# Patient Record
Sex: MALE | Race: WHITE | NOT HISPANIC OR LATINO | Employment: FULL TIME | RURAL
[De-identification: names, ages, dates, MRNs, and addresses within clinical notes are randomized per-mention and may not be internally consistent; named-entity substitution may affect disease eponyms.]

---

## 2021-01-04 ENCOUNTER — OFFICE VISIT (OUTPATIENT)
Dept: FAMILY MEDICINE CLINIC | Facility: CLINIC | Age: 36
End: 2021-01-04

## 2021-01-04 VITALS
HEIGHT: 72 IN | SYSTOLIC BLOOD PRESSURE: 124 MMHG | DIASTOLIC BLOOD PRESSURE: 81 MMHG | OXYGEN SATURATION: 97 % | TEMPERATURE: 97.3 F | HEART RATE: 75 BPM | BODY MASS INDEX: 37.76 KG/M2 | RESPIRATION RATE: 16 BRPM | WEIGHT: 278.8 LBS

## 2021-01-04 DIAGNOSIS — R53.83 MALAISE AND FATIGUE: ICD-10-CM

## 2021-01-04 DIAGNOSIS — E66.01 CLASS 2 SEVERE OBESITY DUE TO EXCESS CALORIES WITH SERIOUS COMORBIDITY AND BODY MASS INDEX (BMI) OF 37.0 TO 37.9 IN ADULT (HCC): ICD-10-CM

## 2021-01-04 DIAGNOSIS — M25.50 ARTHRALGIA, UNSPECIFIED JOINT: ICD-10-CM

## 2021-01-04 DIAGNOSIS — Z87.891 HISTORY OF TOBACCO USE: ICD-10-CM

## 2021-01-04 DIAGNOSIS — R53.81 MALAISE AND FATIGUE: ICD-10-CM

## 2021-01-04 DIAGNOSIS — E55.9 VITAMIN D DEFICIENCY: ICD-10-CM

## 2021-01-04 DIAGNOSIS — Z13.220 SCREENING FOR HYPERLIPIDEMIA: ICD-10-CM

## 2021-01-04 DIAGNOSIS — Z00.00 ANNUAL PHYSICAL EXAM: Primary | ICD-10-CM

## 2021-01-04 PROBLEM — D22.5 COMPOUND NEVUS OF BACK: Status: ACTIVE | Noted: 2021-01-04

## 2021-01-04 LAB
BILIRUB BLD-MCNC: NEGATIVE MG/DL
CLARITY, POC: CLEAR
COLOR UR: YELLOW
GLUCOSE UR STRIP-MCNC: NEGATIVE MG/DL
KETONES UR QL: NEGATIVE
LEUKOCYTE EST, POC: NEGATIVE
NITRITE UR-MCNC: NEGATIVE MG/ML
PH UR: 5 [PH] (ref 5–8)
PROT UR STRIP-MCNC: NEGATIVE MG/DL
RBC # UR STRIP: NEGATIVE /UL
SP GR UR: 1.02 (ref ?–1.03)
UROBILINOGEN UR QL: NORMAL

## 2021-01-04 PROCEDURE — 81002 URINALYSIS NONAUTO W/O SCOPE: CPT | Performed by: FAMILY MEDICINE

## 2021-01-04 PROCEDURE — 99385 PREV VISIT NEW AGE 18-39: CPT | Performed by: FAMILY MEDICINE

## 2021-01-04 RX ORDER — LORATADINE 5 MG/1
1 TABLET, CHEWABLE ORAL DAILY
COMMUNITY

## 2021-01-04 NOTE — PROGRESS NOTES
Subjective   Yovani Franks is a 35 y.o. male.     Chief Complaint   Patient presents with   • Annual Exam   • Abdominal Pain     after consumption of dairy        The patient is here: to discuss health maintenance and disease prevention.  Last comprehensive physical was on Dec 2015.  Patient's previous physician was Dr Mae .  Overall has: moderate activity with work/home activities, good appetite, feels well with minor complaints and good energy level. Nutrition: eating a variety of foods. Last tetanus shot was unknown. Patient's last colonoscopy was: unknown.    Toe Pain   The incident occurred more than 1 week ago. The pain is present in the right foot. The quality of the pain is described as aching and stabbing. The pain is at a severity of 6/10. The pain is moderate. The pain has been constant since onset. Pertinent negatives include no inability to bear weight or loss of sensation. He has tried ice for the symptoms. The treatment provided significant relief.   Abdominal Pain  This is a new problem. The current episode started more than 1 year ago. The onset quality is gradual. The problem occurs intermittently. The most recent episode lasted 1 day. The problem has been unchanged. The quality of the pain is cramping. Associated symptoms include diarrhea. Pertinent negatives include no constipation, fever, frequency, hematuria, nausea or vomiting.        Recent Hospitalizations:  No hospitalization(s) within the last year..  ccc      I personally reviewed and updated the patient's allergies, medications, problem list, and past medical, surgical, social, and family history. I have reviewed and confirmed the accuracy of the HPI and ROS as documented by the MA/LPN/RN Mainor Pacheco MD    Family History   Problem Relation Age of Onset   • Diabetes Father        Social History     Tobacco Use   • Smoking status: Former Smoker     Packs/day: 1.00     Years: 3.00     Pack years: 3.00     Types: Cigarettes      "Start date:      Quit date:      Years since quittin.0   • Smokeless tobacco: Never Used   Substance Use Topics   • Alcohol use: Yes     Frequency: 2-4 times a month     Drinks per session: 1 or 2     Binge frequency: Less than monthly   • Drug use: Not Currently       Past Surgical History:   Procedure Laterality Date   • ADENOIDECTOMY     • KNEE ARTHROTOMY Right    • TONSILLECTOMY         Patient Active Problem List   Diagnosis   • Compound nevus of back   • Annual physical exam   • Class 2 severe obesity due to excess calories with serious comorbidity and body mass index (BMI) of 37.0 to 37.9 in adult (CMS/[facility])   • History of tobacco use         Current Outpatient Medications:   •  loratadine (Claritin) 5 MG chewable tablet, Chew 1 tablet Daily., Disp: , Rfl:          Review of Systems   Constitutional: Negative for chills, diaphoresis and fever.   HENT: Negative for trouble swallowing and voice change.    Eyes: Negative for visual disturbance.   Respiratory: Negative for shortness of breath.    Cardiovascular: Negative for chest pain and palpitations.   Gastrointestinal: Positive for abdominal pain and diarrhea. Negative for constipation, nausea and vomiting.   Endocrine: Negative for polydipsia and polyphagia.   Genitourinary: Negative for frequency and hematuria.   Musculoskeletal: Negative for neck stiffness.   Skin: Negative for color change and pallor.   Allergic/Immunologic: Negative for immunocompromised state.   Neurological: Negative for seizures and syncope.   Hematological: Negative for adenopathy.   Psychiatric/Behavioral: Negative for sleep disturbance and suicidal ideas.       I have reviewed and confirmed the accuracy of the ROS as documented by the MA/LPN/RN Mainor Pacheco MD      Objective   /81 (BP Location: Right arm, Patient Position: Sitting)   Pulse 75   Temp 97.3 °F (36.3 °C)   Resp 16   Ht 182.9 cm (72\")   Wt 126 kg (278 lb 12.8 oz)   SpO2 97%   BMI " 37.81 kg/m²   BP Readings from Last 3 Encounters:   01/04/21 124/81     Wt Readings from Last 3 Encounters:   01/04/21 126 kg (278 lb 12.8 oz)     Physical Exam  Constitutional:       Appearance: He is well-developed. He is not diaphoretic.   HENT:      Head: Normocephalic.      Right Ear: Tympanic membrane, ear canal and external ear normal.      Left Ear: Tympanic membrane, ear canal and external ear normal.      Nose: Nose normal.   Eyes:      General: Lids are normal.      Conjunctiva/sclera: Conjunctivae normal.      Pupils: Pupils are equal, round, and reactive to light.   Neck:      Thyroid: No thyromegaly.      Vascular: No carotid bruit or JVD.      Trachea: No tracheal deviation.   Cardiovascular:      Rate and Rhythm: Normal rate and regular rhythm.      Heart sounds: Normal heart sounds. No murmur. No friction rub. No gallop.    Pulmonary:      Effort: Pulmonary effort is normal.      Breath sounds: Normal breath sounds. No stridor. No decreased breath sounds, wheezing or rales.   Abdominal:      General: Bowel sounds are normal. There is no distension.      Palpations: Abdomen is soft. There is no mass.      Tenderness: There is no abdominal tenderness. There is no guarding or rebound.      Hernia: No hernia is present.   Lymphadenopathy:      Head:      Right side of head: No submental, submandibular, tonsillar, preauricular, posterior auricular or occipital adenopathy.      Left side of head: No submental, submandibular, tonsillar, preauricular, posterior auricular or occipital adenopathy.      Cervical: No cervical adenopathy.   Skin:     General: Skin is warm and dry.      Coloration: Skin is not pale.   Neurological:      Mental Status: He is alert and oriented to person, place, and time.      Cranial Nerves: No cranial nerve deficit.      Sensory: No sensory deficit.      Coordination: Coordination normal.      Gait: Gait normal.      Deep Tendon Reflexes: Reflexes are normal and symmetric.          Data / Lab Results:    No results found for: HGBA1C  Lab Results   Component Value Date     (H) 01/04/2021     Lab Results   Component Value Date     (H) 01/04/2021     No results found for: CHOL  Lab Results   Component Value Date    TRIG 63 01/04/2021     Lab Results   Component Value Date    HDL 47 01/04/2021     No results found for: PSA  Lab Results   Component Value Date    WBC 7.1 01/04/2021    HGB 15.9 01/04/2021    HCT 46.1 01/04/2021    MCV 89 01/04/2021     01/04/2021     Lab Results   Component Value Date    TSH 1.180 01/04/2021      Lab Results   Component Value Date    BUN 10 01/04/2021    CREATININE 0.86 01/04/2021    EGFRIFNONA 114 01/04/2021    EGFRIFAFRI 132 01/04/2021    BCR 12 01/04/2021    K 4.7 01/04/2021    CO2 25 01/04/2021    CALCIUM 9.7 01/04/2021    PROTENTOTREF 7.5 01/04/2021    ALBUMIN 4.6 01/04/2021    LABIL2 1.6 01/04/2021    AST 16 01/04/2021    ALT 28 01/04/2021     No results found for: MARIIA, RF, SEDRATE   No results found for: CRP   No results found for: IRON, TIBC, FERRITIN   No results found for: [id number]     Age-appropriate Screening Schedule:  Refer to the list below for future screening recommendations based on patient's age, sex and/or medical conditions. Orders for these recommended tests are listed in the plan section. The patient has been provided with a written plan.    Health Maintenance   Topic Date Due   • TDAP/TD VACCINES (2 - Tdap) 05/29/1998   • INFLUENZA VACCINE  08/01/2020           Assessment/Plan      Medications        Problem List         LOS    Physical.  Doing well, vaccines updated.  He agrees to update tetanus shot at [employer].  Discussed health maintenance, screening test, lifestyle modification.  Screening for hyperlipidemia.  Check fasting labs.  Toe pain.  Possibly secondary to gout, positive family history.  Discussed diet, lifestyle [place].  Check uric acid level.  Dysthymia.  Check vitamin D level\start supplement.   Overall coping well.        Diagnoses and all orders for this visit:    1. Annual physical exam (Primary)  -     POCT urinalysis dipstick, manual    2. Malaise and fatigue  -     CBC & Differential  -     Comprehensive Metabolic Panel  -     TSH    3. Arthralgia, unspecified joint  -     Uric Acid    4. Screening for hyperlipidemia  -     Lipid Panel With / Chol / HDL Ratio    5. Vitamin D deficiency  -     Vitamin D 25 Hydroxy    6. Class 2 severe obesity due to excess calories with serious comorbidity and body mass index (BMI) of 37.0 to 37.9 in adult (CMS/[facility])    7. History of tobacco use              Expected course, medications, and adverse effects discussed.  Call or return if worsening or persistent symptoms.  I wore protective equipment throughout this patient encounter including a mask, gloves, and eye protection.  Hand hygiene was performed before donning protective equipment and after removal when leaving the room. The complete contents of the Assessment and Plan and Data / Lab Results as documented above have been reviewed and addressed by myself with the patient today as part of an ongoing evaluation / treatment plan.  If some of the documentation has been copied from a previous note and is unchanged it indicates that this problem / plan has been assessed today but is stable from a previous visit and no changes have been recommended.

## 2021-01-05 LAB
25(OH)D3+25(OH)D2 SERPL-MCNC: 21.4 NG/ML (ref 30–100)
ALBUMIN SERPL-MCNC: 4.6 G/DL (ref 4–5)
ALBUMIN/GLOB SERPL: 1.6 {RATIO} (ref 1.2–2.2)
ALP SERPL-CCNC: 61 IU/L (ref 39–117)
ALT SERPL-CCNC: 28 IU/L (ref 0–44)
AST SERPL-CCNC: 16 IU/L (ref 0–40)
BASOPHILS # BLD AUTO: 0 X10E3/UL (ref 0–0.2)
BASOPHILS NFR BLD AUTO: 1 %
BILIRUB SERPL-MCNC: 0.6 MG/DL (ref 0–1.2)
BUN SERPL-MCNC: 10 MG/DL (ref 6–20)
BUN/CREAT SERPL: 12 (ref 9–20)
CALCIUM SERPL-MCNC: 9.7 MG/DL (ref 8.7–10.2)
CHLORIDE SERPL-SCNC: 99 MMOL/L (ref 96–106)
CHOLEST SERPL-MCNC: 160 MG/DL (ref 100–199)
CHOLEST/HDLC SERPL: 3.4 RATIO (ref 0–5)
CO2 SERPL-SCNC: 25 MMOL/L (ref 20–29)
CREAT SERPL-MCNC: 0.86 MG/DL (ref 0.76–1.27)
EOSINOPHIL # BLD AUTO: 0.2 X10E3/UL (ref 0–0.4)
EOSINOPHIL NFR BLD AUTO: 2 %
ERYTHROCYTE [DISTWIDTH] IN BLOOD BY AUTOMATED COUNT: 12.3 % (ref 11.6–15.4)
GLOBULIN SER CALC-MCNC: 2.9 G/DL (ref 1.5–4.5)
GLUCOSE SERPL-MCNC: 109 MG/DL (ref 65–99)
HCT VFR BLD AUTO: 46.1 % (ref 37.5–51)
HDLC SERPL-MCNC: 47 MG/DL
HGB BLD-MCNC: 15.9 G/DL (ref 13–17.7)
IMM GRANULOCYTES # BLD AUTO: 0 X10E3/UL (ref 0–0.1)
IMM GRANULOCYTES NFR BLD AUTO: 0 %
LDLC SERPL CALC-MCNC: 100 MG/DL (ref 0–99)
LYMPHOCYTES # BLD AUTO: 2.2 X10E3/UL (ref 0.7–3.1)
LYMPHOCYTES NFR BLD AUTO: 31 %
MCH RBC QN AUTO: 30.7 PG (ref 26.6–33)
MCHC RBC AUTO-ENTMCNC: 34.5 G/DL (ref 31.5–35.7)
MCV RBC AUTO: 89 FL (ref 79–97)
MONOCYTES # BLD AUTO: 0.6 X10E3/UL (ref 0.1–0.9)
MONOCYTES NFR BLD AUTO: 8 %
NEUTROPHILS # BLD AUTO: 4.1 X10E3/UL (ref 1.4–7)
NEUTROPHILS NFR BLD AUTO: 58 %
PLATELET # BLD AUTO: 287 X10E3/UL (ref 150–450)
POTASSIUM SERPL-SCNC: 4.7 MMOL/L (ref 3.5–5.2)
PROT SERPL-MCNC: 7.5 G/DL (ref 6–8.5)
RBC # BLD AUTO: 5.18 X10E6/UL (ref 4.14–5.8)
SODIUM SERPL-SCNC: 138 MMOL/L (ref 134–144)
TRIGL SERPL-MCNC: 63 MG/DL (ref 0–149)
TSH SERPL DL<=0.005 MIU/L-ACNC: 1.18 UIU/ML (ref 0.45–4.5)
URATE SERPL-MCNC: 7.7 MG/DL (ref 3.8–8.4)
VLDLC SERPL CALC-MCNC: 13 MG/DL (ref 5–40)
WBC # BLD AUTO: 7.1 X10E3/UL (ref 3.4–10.8)

## 2021-01-11 ENCOUNTER — TELEPHONE (OUTPATIENT)
Dept: FAMILY MEDICINE CLINIC | Facility: CLINIC | Age: 36
End: 2021-01-11

## 2021-01-11 DIAGNOSIS — E55.9 VITAMIN D DEFICIENCY: ICD-10-CM

## 2021-01-11 DIAGNOSIS — E55.9 VITAMIN D DEFICIENCY: Primary | ICD-10-CM

## 2021-01-11 RX ORDER — ERGOCALCIFEROL 1.25 MG/1
50000 CAPSULE ORAL WEEKLY
Qty: 6 CAPSULE | Refills: 0 | Status: SHIPPED | OUTPATIENT
Start: 2021-01-11 | End: 2021-01-12 | Stop reason: SDUPTHER

## 2021-01-11 NOTE — TELEPHONE ENCOUNTER
----- Message from Mainor Pacheco MD sent at 1/9/2021  8:40 AM EST -----  Let him know his blood work overall looks good, blood count, kidney function are normal, his uric acid level for gout is also normal, he does have a mild elevation of his blood sugar, want him to work on diet and exercise, his vitamin D level is very low, this may be causing him to have some fatigue in the winter months, go ahead and send in high-dose vitamin D 3, 50,000 units weekly for 6 weeks, after that he should take 4000 units of over-the-counter vitamin D daily, but schedule him back in a year for a physical, fasting for panel plus a vitamin D level ahead, thanks

## 2021-01-12 DIAGNOSIS — E55.9 VITAMIN D DEFICIENCY: ICD-10-CM

## 2021-01-12 RX ORDER — ERGOCALCIFEROL 1.25 MG/1
50000 CAPSULE ORAL WEEKLY
Qty: 6 CAPSULE | Refills: 0 | Status: SHIPPED | OUTPATIENT
Start: 2021-01-12 | End: 2021-01-12 | Stop reason: SDUPTHER

## 2021-01-12 RX ORDER — ERGOCALCIFEROL 1.25 MG/1
50000 CAPSULE ORAL WEEKLY
Qty: 6 CAPSULE | Refills: 0 | Status: SHIPPED | OUTPATIENT
Start: 2021-01-12

## 2021-01-22 ENCOUNTER — OFFICE VISIT (OUTPATIENT)
Dept: FAMILY MEDICINE CLINIC | Facility: CLINIC | Age: 36
End: 2021-01-22

## 2021-01-22 VITALS
WEIGHT: 268.8 LBS | RESPIRATION RATE: 18 BRPM | HEIGHT: 72 IN | TEMPERATURE: 98.4 F | DIASTOLIC BLOOD PRESSURE: 78 MMHG | SYSTOLIC BLOOD PRESSURE: 120 MMHG | HEART RATE: 88 BPM | OXYGEN SATURATION: 97 % | BODY MASS INDEX: 36.41 KG/M2

## 2021-01-22 DIAGNOSIS — M79.641 BILATERAL HAND PAIN: Primary | ICD-10-CM

## 2021-01-22 DIAGNOSIS — M79.642 BILATERAL HAND PAIN: Primary | ICD-10-CM

## 2021-01-22 DIAGNOSIS — R60.9 EDEMA, UNSPECIFIED TYPE: ICD-10-CM

## 2021-01-22 DIAGNOSIS — R21 RASH OF BOTH HANDS: ICD-10-CM

## 2021-01-22 PROCEDURE — 99213 OFFICE O/P EST LOW 20 MIN: CPT | Performed by: FAMILY MEDICINE

## 2021-01-22 RX ORDER — BETAMETHASONE DIPROPIONATE 0.5 MG/G
CREAM TOPICAL 2 TIMES DAILY
Qty: 60 G | Refills: 1 | Status: SHIPPED | OUTPATIENT
Start: 2021-01-22

## 2021-01-22 RX ORDER — PREDNISONE 1 MG/1
TABLET ORAL
Qty: 45 TABLET | Refills: 0 | Status: SHIPPED | OUTPATIENT
Start: 2021-01-22

## 2021-01-22 RX ORDER — BETAMETHASONE DIPROPIONATE 0.05 %
OINTMENT (GRAM) TOPICAL NIGHTLY
Qty: 60 G | Refills: 1 | Status: SHIPPED | OUTPATIENT
Start: 2021-01-22

## 2021-01-22 NOTE — PROGRESS NOTES
Subjective   Yovani Franks is a 35 y.o. male.     Chief Complaint   Patient presents with   • Hand Pain     Bilateral    • Leg Swelling       Hand Pain   The incident occurred 3 to 5 days ago. The incident occurred at home. There was no injury mechanism. The pain is present in the left fingers, right hand, left hand, right wrist, left wrist and right fingers. The quality of the pain is described as aching and burning (tingling). The pain does not radiate. The pain is at a severity of 8/10. The pain is moderate. The pain has been fluctuating since the incident. Associated symptoms include muscle weakness and tingling. Pertinent negatives include no chest pain or numbness. Associated symptoms comments: throbbing. The symptoms are aggravated by palpation. He has tried NSAIDs and ice for the symptoms. The treatment provided mild relief.   Leg Swelling  This is a new problem. The current episode started 1 to 4 weeks ago. The problem occurs 2 to 4 times per day. The problem has been resolved. Pertinent negatives include no abdominal pain, chest pain, chills, diaphoresis, nausea or numbness. The symptoms are aggravated by walking and standing. He has tried NSAIDs, rest and relaxation for the symptoms. The treatment provided moderate relief.            I personally reviewed and updated the patient's allergies, medications, problem list, and past medical, surgical, social, and family history. I have reviewed and confirmed the accuracy of the History of Present Illness and Review of Symptoms as documented by the MA/GARYN/RN. Mainor Pacheco MD    Family History   Problem Relation Age of Onset   • Diabetes Father        Social History     Tobacco Use   • Smoking status: Former Smoker     Packs/day: 1.00     Years: 3.00     Pack years: 3.00     Types: Cigarettes     Start date:      Quit date:      Years since quittin.0   • Smokeless tobacco: Never Used   Substance Use Topics   • Alcohol use: Yes     Frequency:  2-4 times a month     Drinks per session: 1 or 2     Binge frequency: Less than monthly   • Drug use: Not Currently       Past Surgical History:   Procedure Laterality Date   • ADENOIDECTOMY  2005   • KNEE ARTHROTOMY Right 2005   • TONSILLECTOMY  2005       Patient Active Problem List   Diagnosis   • Compound nevus of back   • Annual physical exam   • Class 2 severe obesity due to excess calories with serious comorbidity and body mass index (BMI) of 37.0 to 37.9 in adult (CMS/HCC)   • History of tobacco use         Current Outpatient Medications:   •  loratadine (Claritin) 5 MG chewable tablet, Chew 1 tablet Daily., Disp: , Rfl:   •  vitamin D (ERGOCALCIFEROL) 1.25 MG (97884 UT) capsule capsule, Take 1 capsule by mouth 1 (One) Time Per Week. For 6 weeks, Disp: 6 capsule, Rfl: 0  •  betamethasone dipropionate (DIPROLENE) 0.05 % ointment, Apply  topically to the appropriate area as directed Every Night. apply to hands one time nightly, Disp: 60 g, Rfl: 1  •  betamethasone dipropionate 0.05 % cream, Apply  topically to the appropriate area as directed 2 (Two) Times a Day. Apply to hands two times a day, Disp: 60 g, Rfl: 1  •  predniSONE (DELTASONE) 5 MG tablet, 40mg x 3 days, 20mg x 3 days, 10mg x 3 days, 5mg x 3 days, Disp: 45 tablet, Rfl: 0         Review of Systems   Constitutional: Negative for chills and diaphoresis.   Eyes: Negative for visual disturbance.   Respiratory: Negative for shortness of breath.    Cardiovascular: Negative for chest pain and palpitations.   Gastrointestinal: Negative for abdominal pain and nausea.   Endocrine: Negative for polydipsia and polyphagia.   Musculoskeletal: Negative for neck stiffness.   Skin: Negative for color change and pallor.   Neurological: Positive for tingling. Negative for seizures, syncope and numbness.   Hematological: Negative for adenopathy.       I have reviewed and confirmed the accuracy of the ROS as documented by the MA/LPN/RN Mainor Pacheco MD      Objective  "  /78 (BP Location: Left arm, Patient Position: Sitting)   Pulse 88   Temp 98.4 °F (36.9 °C)   Resp 18   Ht 182.9 cm (72\")   Wt 122 kg (268 lb 12.8 oz)   SpO2 97%   BMI 36.46 kg/m²   BP Readings from Last 3 Encounters:   01/22/21 120/78   01/04/21 124/81     Wt Readings from Last 3 Encounters:   01/22/21 122 kg (268 lb 12.8 oz)   01/04/21 126 kg (278 lb 12.8 oz)     Physical Exam  Constitutional:       Appearance: Normal appearance. He is well-developed. He is not diaphoretic.   Cardiovascular:      Rate and Rhythm: Normal rate and regular rhythm.      Pulses: Normal pulses.      Heart sounds: Normal heart sounds, S1 normal and S2 normal. No murmur. No friction rub. No gallop.    Pulmonary:      Effort: Pulmonary effort is normal. No accessory muscle usage.      Breath sounds: Normal breath sounds. No stridor. No decreased breath sounds, wheezing, rhonchi or rales.   Abdominal:      General: Bowel sounds are normal. There is no distension.      Palpations: Abdomen is soft. Abdomen is not rigid. There is no mass or pulsatile mass.      Tenderness: There is no abdominal tenderness. There is no guarding or rebound. Negative signs include Harris's sign.      Hernia: No hernia is present.   Skin:     General: Skin is warm and dry.      Coloration: Skin is not pale.   Neurological:      Mental Status: He is alert and oriented to person, place, and time.      Coordination: Coordination normal.      Gait: Gait normal.         Data / Lab Results:    No results found for: HGBA1C  Lab Results   Component Value Date     (H) 01/04/2021     Lab Results   Component Value Date     (H) 01/04/2021     No results found for: CHOL  Lab Results   Component Value Date    TRIG 63 01/04/2021     Lab Results   Component Value Date    HDL 47 01/04/2021     No results found for: PSA  Lab Results   Component Value Date    WBC 7.1 01/04/2021    HGB 15.9 01/04/2021    HCT 46.1 01/04/2021    MCV 89 01/04/2021     " 01/04/2021     Lab Results   Component Value Date    TSH 1.180 01/04/2021      Lab Results   Component Value Date    BUN 10 01/04/2021    CREATININE 0.86 01/04/2021    EGFRIFNONA 114 01/04/2021    EGFRIFAFRI 132 01/04/2021    BCR 12 01/04/2021    K 4.7 01/04/2021    CO2 25 01/04/2021    CALCIUM 9.7 01/04/2021    PROTENTOTREF 7.5 01/04/2021    ALBUMIN 4.6 01/04/2021    LABIL2 1.6 01/04/2021    AST 16 01/04/2021    ALT 28 01/04/2021     No results found for: MARIIA, RF, SEDRATE   No results found for: CRP   No results found for: IRON, TIBC, FERRITIN   No results found for: [id number]       Assessment/Plan      Medications        Problem List         LOS      Health maintenance.  Doing well, vaccines updated.  He agrees to update tetanus shot at [employer].  Discussed health maintenance, screening test, lifestyle modification.  Screening for hyperlipidemia.  Check fasting labs.  Toe pain.  Improved today.  Positive family history gout, uric acid benign 1/20.    Dysthymia.    Improved currently with vitamin D supplementation.  Overall coping well  Dyshidrotic eczema.  Flare currently, start prednisone.  Start betamethasone.  Topical care discussed.  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  DDx includes allergies/consider allergist referral if persistent symptoms.  Vitamin D deficiency.  Improving currently on replacement.  Follow-up recheck levels.      Diagnoses and all orders for this visit:    1. Bilateral hand pain (Primary)  -     predniSONE (DELTASONE) 5 MG tablet; 40mg x 3 days, 20mg x 3 days, 10mg x 3 days, 5mg x 3 days  Dispense: 45 tablet; Refill: 0  -     betamethasone dipropionate 0.05 % cream; Apply  topically to the appropriate area as directed 2 (Two) Times a Day. Apply to hands two times a day  Dispense: 60 g; Refill: 1  -     betamethasone dipropionate (DIPROLENE) 0.05 % ointment; Apply  topically to the appropriate area as directed Every Night. apply to hands one time  nightly  Dispense: 60 g; Refill: 1    2. Edema, unspecified type  -     predniSONE (DELTASONE) 5 MG tablet; 40mg x 3 days, 20mg x 3 days, 10mg x 3 days, 5mg x 3 days  Dispense: 45 tablet; Refill: 0  -     betamethasone dipropionate 0.05 % cream; Apply  topically to the appropriate area as directed 2 (Two) Times a Day. Apply to hands two times a day  Dispense: 60 g; Refill: 1  -     betamethasone dipropionate (DIPROLENE) 0.05 % ointment; Apply  topically to the appropriate area as directed Every Night. apply to hands one time nightly  Dispense: 60 g; Refill: 1    3. Rash of both hands  -     predniSONE (DELTASONE) 5 MG tablet; 40mg x 3 days, 20mg x 3 days, 10mg x 3 days, 5mg x 3 days  Dispense: 45 tablet; Refill: 0  -     betamethasone dipropionate 0.05 % cream; Apply  topically to the appropriate area as directed 2 (Two) Times a Day. Apply to hands two times a day  Dispense: 60 g; Refill: 1  -     betamethasone dipropionate (DIPROLENE) 0.05 % ointment; Apply  topically to the appropriate area as directed Every Night. apply to hands one time nightly  Dispense: 60 g; Refill: 1              Expected course, medications, and adverse effects discussed.  Call or return if worsening or persistent symptoms.  I wore protective equipment throughout this patient encounter including a mask, gloves, and eye protection.  Hand hygiene was performed before donning protective equipment and after removal when leaving the room. The complete contents of the Assessment and Plan and Data/Lab Results as documented above have been reviewed and addressed by myself with the patient today as part of an ongoing evaluation / treatment plan.  If some of the documentation has been copied from a previous note and is unchanged it indicates that this problem / plan has been assessed today but is stable from a previous visit and no changes have been recommended.

## 2021-01-26 ENCOUNTER — TELEPHONE (OUTPATIENT)
Dept: FAMILY MEDICINE CLINIC | Facility: CLINIC | Age: 36
End: 2021-01-26

## 2021-01-26 NOTE — TELEPHONE ENCOUNTER
Of blood the year he is improving with the prednisone, let us have him finish the course, the cream may be just irritating his skin, would wait a few days again before trying the cream again, if his symptoms recur after he completes the course of prednisone let us know we will send another course, thanks

## 2021-01-26 NOTE — TELEPHONE ENCOUNTER
Spoke to patient. He said he started prednisone on Friday. They didn't have the ointment or lotion on Friday. They finally got it in today. His hands got better with just the prednisone. he started the cream and lotion today, Since using the lotion it has flared up again and his hands hurt so bad like he has been catching fast balls bare handed.

## 2021-02-01 ENCOUNTER — TELEPHONE (OUTPATIENT)
Dept: FAMILY MEDICINE CLINIC | Facility: CLINIC | Age: 36
End: 2021-02-01

## 2021-02-01 NOTE — TELEPHONE ENCOUNTER
Will be taking last steroid tomorrow. Is still having a pretty bad flare. Does seem worse at night. He said this is not going away. Is asking What can be done?

## 2021-02-02 NOTE — TELEPHONE ENCOUNTER
Okay to send another round of the 5 mg prednisone taper, he should come back in to be reevaluated if he does not improve, thanks

## 2021-02-03 DIAGNOSIS — R60.9 EDEMA, UNSPECIFIED TYPE: ICD-10-CM

## 2021-02-03 DIAGNOSIS — R21 RASH OF BOTH HANDS: ICD-10-CM

## 2021-02-03 DIAGNOSIS — M79.641 BILATERAL HAND PAIN: Primary | ICD-10-CM

## 2021-02-03 DIAGNOSIS — M79.642 BILATERAL HAND PAIN: Primary | ICD-10-CM

## 2021-02-03 RX ORDER — PREDNISONE 1 MG/1
TABLET ORAL
Qty: 45 TABLET | Refills: 0 | Status: SHIPPED | OUTPATIENT
Start: 2021-02-03

## 2021-02-12 NOTE — TELEPHONE ENCOUNTER
Spoke with Ginger at [facility] for clarification of Vit D2 Rx. I verified signature again of take 1 capsule by mouth once weekly x 6 weeks